# Patient Record
Sex: FEMALE | Race: ASIAN | NOT HISPANIC OR LATINO | URBAN - METROPOLITAN AREA
[De-identification: names, ages, dates, MRNs, and addresses within clinical notes are randomized per-mention and may not be internally consistent; named-entity substitution may affect disease eponyms.]

---

## 2020-12-29 ENCOUNTER — PREPPED CHART (OUTPATIENT)
Dept: URBAN - METROPOLITAN AREA CLINIC 34 | Facility: CLINIC | Age: 60
End: 2020-12-29

## 2020-12-29 PROBLEM — H04.123 DRY EYE SYNDROME: Noted: 2020-12-29

## 2020-12-29 PROBLEM — E11.9 DIABETES, TYPE II, NO OCULAR COMPLICATIONS: Status: STABILIZING | Noted: 2020-12-29

## 2020-12-29 PROBLEM — E11.9 DIABETES, TYPE II, NO OCULAR COMPLICATIONS: Noted: 2020-12-29

## 2020-12-29 PROBLEM — H52.223 ASTIGMATISM, REGULAR: Noted: 2020-12-29

## 2020-12-29 PROBLEM — H25.013 CORTICAL AGE-RELATED CATARACT: Noted: 2020-12-29

## 2022-02-01 ENCOUNTER — 1 YEAR COMPLETE EXAM (OUTPATIENT)
Dept: URBAN - METROPOLITAN AREA CLINIC 34 | Facility: CLINIC | Age: 62
End: 2022-02-01

## 2022-02-01 DIAGNOSIS — E11.9: ICD-10-CM

## 2022-02-01 DIAGNOSIS — H52.13: ICD-10-CM

## 2022-02-01 DIAGNOSIS — H25.813: ICD-10-CM

## 2022-02-01 DIAGNOSIS — H52.223: ICD-10-CM

## 2022-02-01 DIAGNOSIS — H04.123: ICD-10-CM

## 2022-02-01 DIAGNOSIS — H52.4: ICD-10-CM

## 2022-02-01 PROCEDURE — 92015 DETERMINE REFRACTIVE STATE: CPT

## 2022-02-01 PROCEDURE — 99214 OFFICE O/P EST MOD 30 MIN: CPT

## 2022-02-01 ASSESSMENT — TONOMETRY
OS_IOP_MMHG: 18
OD_IOP_MMHG: 18

## 2022-02-01 ASSESSMENT — VISUAL ACUITY
OS_SC: 20/50
OS_PH: 20/40
OD_SC: 20/40
OS_SC: 20/100
OD_SC: 20/200
OD_PH: 20/30

## 2022-03-03 ENCOUNTER — FOLLOW UP (OUTPATIENT)
Dept: URBAN - METROPOLITAN AREA CLINIC 34 | Facility: CLINIC | Age: 62
End: 2022-03-03

## 2022-03-03 DIAGNOSIS — H25.813: ICD-10-CM

## 2022-03-03 PROCEDURE — 99213 OFFICE O/P EST LOW 20 MIN: CPT | Mod: NC

## 2022-03-29 ENCOUNTER — PROCEDURE ONLY (OUTPATIENT)
Dept: URBAN - METROPOLITAN AREA CLINIC 34 | Facility: CLINIC | Age: 62
End: 2022-03-29

## 2022-03-29 ENCOUNTER — FOLLOW UP (OUTPATIENT)
Dept: URBAN - METROPOLITAN AREA CLINIC 34 | Facility: CLINIC | Age: 62
End: 2022-03-29

## 2022-03-29 DIAGNOSIS — H25.813: ICD-10-CM

## 2022-03-29 DIAGNOSIS — H52.223: ICD-10-CM

## 2022-03-29 PROCEDURE — 99213 OFFICE O/P EST LOW 20 MIN: CPT

## 2022-03-29 PROCEDURE — 92136 OPHTHALMIC BIOMETRY: CPT

## 2022-03-29 ASSESSMENT — TONOMETRY
OD_IOP_MMHG: 16
OS_IOP_MMHG: 17

## 2022-08-30 ENCOUNTER — FOLLOW UP (OUTPATIENT)
Dept: URBAN - METROPOLITAN AREA CLINIC 34 | Facility: CLINIC | Age: 62
End: 2022-08-30

## 2022-08-30 DIAGNOSIS — E11.9: ICD-10-CM

## 2022-08-30 DIAGNOSIS — H25.813: ICD-10-CM

## 2022-08-30 PROCEDURE — 99213 OFFICE O/P EST LOW 20 MIN: CPT

## 2022-08-30 ASSESSMENT — KERATOMETRY
OD_K2POWER_DIOPTERS: 46.00
OD_AXISANGLE_DEGREES: 47
OS_K1POWER_DIOPTERS: 45.75
OS_AXISANGLE2_DEGREES: 62
OD_AXISANGLE2_DEGREES: 137
OD_K1POWER_DIOPTERS: 45.75
OS_AXISANGLE_DEGREES: 152
OS_K2POWER_DIOPTERS: 46.00

## 2022-08-30 ASSESSMENT — VISUAL ACUITY
OS_SC: 20/80
OS_PH: 20/40-2
OD_SC: 20/50-1

## 2022-08-30 ASSESSMENT — TONOMETRY
OS_IOP_MMHG: 17
OD_IOP_MMHG: 18

## 2022-09-13 ENCOUNTER — SURGERY/PROCEDURE (OUTPATIENT)
Dept: URBAN - METROPOLITAN AREA SURGICAL CENTER 20 | Facility: SURGICAL CENTER | Age: 62
End: 2022-09-13

## 2022-09-13 DIAGNOSIS — H25.812: ICD-10-CM

## 2022-09-13 PROCEDURE — 66984 XCAPSL CTRC RMVL W/O ECP: CPT

## 2022-09-13 PROCEDURE — MISCFEMTO FEMTO

## 2022-09-14 ENCOUNTER — 1 DAY POST-OP (OUTPATIENT)
Dept: URBAN - METROPOLITAN AREA CLINIC 93 | Facility: CLINIC | Age: 62
End: 2022-09-14

## 2022-09-14 DIAGNOSIS — Z96.1: ICD-10-CM

## 2022-09-14 PROCEDURE — 99024 POSTOP FOLLOW-UP VISIT: CPT

## 2022-09-14 ASSESSMENT — KERATOMETRY
OS_K1POWER_DIOPTERS: 45.75
OD_K1POWER_DIOPTERS: 45.75
OS_AXISANGLE_DEGREES: 152
OS_AXISANGLE2_DEGREES: 62
OD_K2POWER_DIOPTERS: 46.00
OD_AXISANGLE_DEGREES: 47
OD_AXISANGLE2_DEGREES: 137
OS_K2POWER_DIOPTERS: 46.00

## 2022-09-14 ASSESSMENT — TONOMETRY: OS_IOP_MMHG: 22

## 2022-09-14 ASSESSMENT — VISUAL ACUITY: OS_SC: 20/20

## 2022-09-20 ENCOUNTER — 1 WEEK POST-OP (OUTPATIENT)
Dept: URBAN - METROPOLITAN AREA CLINIC 34 | Facility: CLINIC | Age: 62
End: 2022-09-20

## 2022-09-20 DIAGNOSIS — Z96.1: ICD-10-CM

## 2022-09-20 PROCEDURE — 99024 POSTOP FOLLOW-UP VISIT: CPT

## 2022-09-20 ASSESSMENT — KERATOMETRY
OD_K1POWER_DIOPTERS: 45.75
OS_K1POWER_DIOPTERS: 45.75
OS_K2POWER_DIOPTERS: 46.00
OD_AXISANGLE2_DEGREES: 137
OS_AXISANGLE2_DEGREES: 62
OD_AXISANGLE_DEGREES: 47
OS_AXISANGLE_DEGREES: 152
OD_K2POWER_DIOPTERS: 46.00

## 2022-09-20 ASSESSMENT — TONOMETRY: OS_IOP_MMHG: 20

## 2022-09-20 ASSESSMENT — VISUAL ACUITY: OS_SC: 20/25+2

## 2022-09-22 ENCOUNTER — POST-OP CHECK (OUTPATIENT)
Dept: URBAN - METROPOLITAN AREA CLINIC 34 | Facility: CLINIC | Age: 62
End: 2022-09-22

## 2022-09-22 DIAGNOSIS — Z96.1: ICD-10-CM

## 2022-09-22 PROCEDURE — 99024 POSTOP FOLLOW-UP VISIT: CPT

## 2022-09-22 ASSESSMENT — TONOMETRY: OS_IOP_MMHG: 21

## 2022-09-22 ASSESSMENT — VISUAL ACUITY: OS_SC: 20/20-2

## 2022-09-22 ASSESSMENT — KERATOMETRY
OD_AXISANGLE_DEGREES: 47
OD_K2POWER_DIOPTERS: 46.00
OD_AXISANGLE2_DEGREES: 137
OS_AXISANGLE_DEGREES: 152
OS_AXISANGLE2_DEGREES: 62
OS_K2POWER_DIOPTERS: 46.00
OD_K1POWER_DIOPTERS: 45.75
OS_K1POWER_DIOPTERS: 45.75

## 2022-09-27 ASSESSMENT — KERATOMETRY
OD_AXISANGLE_DEGREES: 47
OS_K1POWER_DIOPTERS: 45.75
OS_K2POWER_DIOPTERS: 46.00
OS_AXISANGLE_DEGREES: 152
OD_K1POWER_DIOPTERS: 45.75
OD_AXISANGLE2_DEGREES: 137
OD_K2POWER_DIOPTERS: 46.00
OS_AXISANGLE2_DEGREES: 62

## 2022-10-06 ENCOUNTER — PROBLEM (OUTPATIENT)
Dept: URBAN - METROPOLITAN AREA CLINIC 34 | Facility: CLINIC | Age: 62
End: 2022-10-06

## 2022-10-06 DIAGNOSIS — Z96.1: ICD-10-CM

## 2022-10-06 PROCEDURE — 99024 POSTOP FOLLOW-UP VISIT: CPT

## 2022-10-06 ASSESSMENT — KERATOMETRY
OS_AXISANGLE_DEGREES: 152
OD_AXISANGLE2_DEGREES: 137
OD_AXISANGLE_DEGREES: 47
OD_K2POWER_DIOPTERS: 46.00
OS_K1POWER_DIOPTERS: 45.75
OD_K1POWER_DIOPTERS: 45.75
OS_K2POWER_DIOPTERS: 46.00
OS_AXISANGLE2_DEGREES: 62

## 2022-10-06 ASSESSMENT — TONOMETRY
OD_IOP_MMHG: 20
OS_IOP_MMHG: 20

## 2022-10-06 ASSESSMENT — VISUAL ACUITY
OS_SC: 20/20-2
OD_SC: 20/50-2

## 2022-10-20 ENCOUNTER — FOLLOW UP (OUTPATIENT)
Dept: URBAN - METROPOLITAN AREA CLINIC 34 | Facility: CLINIC | Age: 62
End: 2022-10-20

## 2022-10-20 DIAGNOSIS — H26.492: ICD-10-CM

## 2022-10-20 DIAGNOSIS — Z96.1: ICD-10-CM

## 2022-10-20 DIAGNOSIS — H25.811: ICD-10-CM

## 2022-10-20 PROCEDURE — 99024 POSTOP FOLLOW-UP VISIT: CPT

## 2022-10-20 ASSESSMENT — KERATOMETRY
OD_K1POWER_DIOPTERS: 45.75
OS_K1POWER_DIOPTERS: 45.75
OD_AXISANGLE_DEGREES: 47
OS_AXISANGLE2_DEGREES: 62
OD_AXISANGLE2_DEGREES: 137
OS_AXISANGLE_DEGREES: 152
OD_K2POWER_DIOPTERS: 46.00
OS_K2POWER_DIOPTERS: 46.00

## 2022-10-20 ASSESSMENT — VISUAL ACUITY
OS_SC: 20/20
OD_PH: 20/25
OD_SC: 20/80

## 2022-10-20 ASSESSMENT — TONOMETRY: OS_IOP_MMHG: 18/19

## 2022-11-10 ENCOUNTER — FOLLOW UP (OUTPATIENT)
Dept: URBAN - METROPOLITAN AREA CLINIC 34 | Facility: CLINIC | Age: 62
End: 2022-11-10

## 2022-11-10 DIAGNOSIS — H26.492: ICD-10-CM

## 2022-11-10 DIAGNOSIS — Z96.1: ICD-10-CM

## 2022-11-10 PROCEDURE — 99024 POSTOP FOLLOW-UP VISIT: CPT

## 2022-11-10 ASSESSMENT — KERATOMETRY
OS_K2POWER_DIOPTERS: 46.00
OD_K2POWER_DIOPTERS: 46.00
OD_AXISANGLE_DEGREES: 47
OD_AXISANGLE2_DEGREES: 137
OS_K1POWER_DIOPTERS: 45.75
OS_AXISANGLE_DEGREES: 152
OD_K1POWER_DIOPTERS: 45.75
OS_AXISANGLE2_DEGREES: 62

## 2022-11-10 ASSESSMENT — VISUAL ACUITY
OD_SC: 20/60-2
OD_PH: 20/40
OS_SC: 20/25-2

## 2022-11-10 ASSESSMENT — TONOMETRY: OS_IOP_MMHG: 17

## 2022-11-28 ENCOUNTER — FOLLOW UP (OUTPATIENT)
Dept: URBAN - METROPOLITAN AREA CLINIC 93 | Facility: CLINIC | Age: 62
End: 2022-11-28

## 2022-11-28 DIAGNOSIS — Z96.1: ICD-10-CM

## 2022-11-28 DIAGNOSIS — H52.13: ICD-10-CM

## 2022-11-28 DIAGNOSIS — H26.492: ICD-10-CM

## 2022-11-28 PROCEDURE — 99024 POSTOP FOLLOW-UP VISIT: CPT

## 2022-11-28 PROCEDURE — 92015 DETERMINE REFRACTIVE STATE: CPT

## 2022-11-28 ASSESSMENT — KERATOMETRY
OD_K2POWER_DIOPTERS: 46.00
OD_AXISANGLE2_DEGREES: 137
OD_AXISANGLE_DEGREES: 47
OS_K1POWER_DIOPTERS: 45.75
OS_AXISANGLE2_DEGREES: 62
OS_K2POWER_DIOPTERS: 46.00
OS_AXISANGLE_DEGREES: 152
OD_K1POWER_DIOPTERS: 45.75

## 2022-11-28 ASSESSMENT — TONOMETRY
OD_IOP_MMHG: 20
OS_IOP_MMHG: 20

## 2022-11-28 ASSESSMENT — VISUAL ACUITY
OU_SC: 20/60
OD_PH: 20/30-1
OD_SC: 20/80
OS_SC: 20/25-2

## 2022-12-29 ENCOUNTER — FOLLOW UP (OUTPATIENT)
Dept: URBAN - METROPOLITAN AREA CLINIC 34 | Facility: CLINIC | Age: 62
End: 2022-12-29

## 2022-12-29 DIAGNOSIS — Z96.1: ICD-10-CM

## 2022-12-29 DIAGNOSIS — H26.492: ICD-10-CM

## 2022-12-29 PROCEDURE — 99213 OFFICE O/P EST LOW 20 MIN: CPT

## 2022-12-29 ASSESSMENT — KERATOMETRY
OD_AXISANGLE2_DEGREES: 137
OS_AXISANGLE_DEGREES: 152
OS_AXISANGLE2_DEGREES: 62
OD_K1POWER_DIOPTERS: 45.75
OD_K2POWER_DIOPTERS: 46.00
OS_K2POWER_DIOPTERS: 46.00
OS_K1POWER_DIOPTERS: 45.75
OD_AXISANGLE_DEGREES: 47

## 2022-12-29 ASSESSMENT — VISUAL ACUITY
OS_SC: 20/25-2
OD_SC: 20/70-2

## 2022-12-29 ASSESSMENT — TONOMETRY
OD_IOP_MMHG: 14
OS_IOP_MMHG: 15

## 2023-01-12 ENCOUNTER — SURGERY/PROCEDURE (OUTPATIENT)
Dept: URBAN - METROPOLITAN AREA SURGICAL CENTER 20 | Facility: SURGICAL CENTER | Age: 63
End: 2023-01-12

## 2023-01-12 DIAGNOSIS — H25.811: ICD-10-CM

## 2023-01-12 PROCEDURE — MISCFEMTO FEMTO

## 2023-01-12 PROCEDURE — 66984 XCAPSL CTRC RMVL W/O ECP: CPT | Mod: 79,RT

## 2023-01-13 ENCOUNTER — 1 DAY POST-OP (OUTPATIENT)
Dept: URBAN - METROPOLITAN AREA CLINIC 93 | Facility: CLINIC | Age: 63
End: 2023-01-13

## 2023-01-13 DIAGNOSIS — Z96.1: ICD-10-CM

## 2023-01-13 PROCEDURE — 99024 POSTOP FOLLOW-UP VISIT: CPT

## 2023-01-13 ASSESSMENT — KERATOMETRY
OS_AXISANGLE_DEGREES: 152
OD_AXISANGLE_DEGREES: 47
OS_AXISANGLE2_DEGREES: 62
OS_K1POWER_DIOPTERS: 45.75
OD_K1POWER_DIOPTERS: 45.75
OD_AXISANGLE2_DEGREES: 137
OS_K2POWER_DIOPTERS: 46.00
OD_K2POWER_DIOPTERS: 46.00

## 2023-01-13 ASSESSMENT — VISUAL ACUITY
OD_PH: 20/20
OD_SC: 20/40

## 2023-01-13 ASSESSMENT — TONOMETRY: OD_IOP_MMHG: 16

## 2023-01-26 ENCOUNTER — 2 WEEK POST-OP (OUTPATIENT)
Dept: URBAN - METROPOLITAN AREA CLINIC 34 | Facility: CLINIC | Age: 63
End: 2023-01-26

## 2023-01-26 DIAGNOSIS — Z96.1: ICD-10-CM

## 2023-01-26 PROCEDURE — 99024 POSTOP FOLLOW-UP VISIT: CPT

## 2023-01-26 ASSESSMENT — TONOMETRY
OS_IOP_MMHG: 19
OD_IOP_MMHG: 19

## 2023-01-26 ASSESSMENT — KERATOMETRY
OD_K1POWER_DIOPTERS: 45.75
OS_K2POWER_DIOPTERS: 46.00
OS_AXISANGLE2_DEGREES: 62
OS_AXISANGLE_DEGREES: 152
OD_AXISANGLE_DEGREES: 47
OD_K2POWER_DIOPTERS: 46.00
OS_K1POWER_DIOPTERS: 45.75
OD_AXISANGLE2_DEGREES: 137

## 2023-01-26 ASSESSMENT — VISUAL ACUITY
OS_SC: 20/25-1
OD_SC: 20/25-2
OU_SC: 20/20

## 2023-02-23 ENCOUNTER — 1 MONTH POST-OP (OUTPATIENT)
Dept: URBAN - METROPOLITAN AREA CLINIC 34 | Facility: CLINIC | Age: 63
End: 2023-02-23

## 2023-02-23 DIAGNOSIS — Z96.1: ICD-10-CM

## 2023-02-23 PROCEDURE — 99024 POSTOP FOLLOW-UP VISIT: CPT

## 2023-02-23 ASSESSMENT — KERATOMETRY
OS_AXISANGLE_DEGREES: 152
OS_K1POWER_DIOPTERS: 45.75
OD_K1POWER_DIOPTERS: 45.75
OS_K2POWER_DIOPTERS: 46.00
OS_AXISANGLE2_DEGREES: 62
OD_AXISANGLE2_DEGREES: 137
OD_AXISANGLE_DEGREES: 47
OD_K2POWER_DIOPTERS: 46.00

## 2023-02-23 ASSESSMENT — VISUAL ACUITY
OS_SC: 20/30-1
OD_SC: 20/30

## 2023-02-23 ASSESSMENT — TONOMETRY
OD_IOP_MMHG: 16
OS_IOP_MMHG: 28

## 2023-03-21 ENCOUNTER — IOP CHECK (OUTPATIENT)
Dept: URBAN - METROPOLITAN AREA CLINIC 34 | Facility: CLINIC | Age: 63
End: 2023-03-21

## 2023-03-21 DIAGNOSIS — Z96.1: ICD-10-CM

## 2023-03-21 PROCEDURE — 99024 POSTOP FOLLOW-UP VISIT: CPT

## 2023-03-21 ASSESSMENT — KERATOMETRY
OD_K1POWER_DIOPTERS: 45.75
OS_K1POWER_DIOPTERS: 45.75
OS_AXISANGLE2_DEGREES: 62
OD_AXISANGLE_DEGREES: 47
OS_AXISANGLE_DEGREES: 152
OD_AXISANGLE2_DEGREES: 137
OS_K2POWER_DIOPTERS: 46.00
OD_K2POWER_DIOPTERS: 46.00

## 2023-03-21 ASSESSMENT — VISUAL ACUITY
OD_SC: 20/30+2
OS_SC: 20/30-2

## 2023-03-21 ASSESSMENT — TONOMETRY
OS_IOP_MMHG: 18
OD_IOP_MMHG: 12

## 2023-11-30 ENCOUNTER — APPOINTMENT (RX ONLY)
Dept: URBAN - METROPOLITAN AREA CLINIC 41 | Facility: CLINIC | Age: 63
Setting detail: DERMATOLOGY
End: 2023-11-30

## 2023-11-30 DIAGNOSIS — L259 CONTACT DERMATITIS AND OTHER ECZEMA, UNSPECIFIED CAUSE: ICD-10-CM | Status: INADEQUATELY CONTROLLED

## 2023-11-30 PROBLEM — L23.9 ALLERGIC CONTACT DERMATITIS, UNSPECIFIED CAUSE: Status: ACTIVE | Noted: 2023-11-30

## 2023-11-30 PROCEDURE — 99203 OFFICE O/P NEW LOW 30 MIN: CPT

## 2023-11-30 PROCEDURE — ? TREATMENT REGIMEN

## 2023-11-30 PROCEDURE — ? COUNSELING

## 2023-11-30 PROCEDURE — ? PRESCRIPTION MEDICATION MANAGEMENT

## 2023-11-30 ASSESSMENT — LOCATION SIMPLE DESCRIPTION DERM
LOCATION SIMPLE: LEFT CHEEK
LOCATION SIMPLE: RIGHT CHEEK

## 2023-11-30 ASSESSMENT — LOCATION DETAILED DESCRIPTION DERM
LOCATION DETAILED: RIGHT INFERIOR CENTRAL MALAR CHEEK
LOCATION DETAILED: LEFT INFERIOR CENTRAL MALAR CHEEK

## 2023-11-30 ASSESSMENT — LOCATION ZONE DERM: LOCATION ZONE: FACE

## 2023-11-30 NOTE — PROCEDURE: PRESCRIPTION MEDICATION MANAGEMENT
Discontinue Regimen: Triamcinolone \\nHydroxyzine
Render In Strict Bullet Format?: No
Detail Level: Zone

## 2023-11-30 NOTE — PROCEDURE: COUNSELING
Detail Level: Detailed
Patient Specific Counseling (Will Not Stick From Patient To Patient): —\\nBG counsels pt to avoid using the retinol again as this is likely what caused the rash. BG counsels pt that she can potentially try a different one but that she should wait 2-3 months. BG counsels pt it is fine to continue using moisturizers but she can also discontinue the triamcinolone.

## 2023-11-30 NOTE — HPI: RASH
Is This A New Presentation, Or A Follow-Up?: Rash
Additional History: New pt here for rash on face that stared after using CeraVe am moisturizer and neutrogena retinol. \\nPt went to urgent care and was given Hydroxyzine, prednisone and triamcinolone which help calm down rash.\\nPt tried using same products that gave her rash before and it came back.\\nUsing triamcinolone topical which is helping some currently.